# Patient Record
Sex: MALE | Race: WHITE | Employment: STUDENT | URBAN - METROPOLITAN AREA
[De-identification: names, ages, dates, MRNs, and addresses within clinical notes are randomized per-mention and may not be internally consistent; named-entity substitution may affect disease eponyms.]

---

## 2019-05-13 ENCOUNTER — PRE VISIT (OUTPATIENT)
Dept: SLEEP MEDICINE | Facility: CLINIC | Age: 24
End: 2019-05-13

## 2019-05-13 NOTE — TELEPHONE ENCOUNTER
"  1.  Reason for the visit:  Consult to discuss possible sleep apnea.    2.  Referring provider and clinic name:  Self  3.  Previous Sleep Doctor or Pulmonlogist (clinic name)?  Has done Home Sleep Study but never followed up after study to get results  4.  Records, Procedures, Imaging, and Labs (see below)  CAROLYN needed for [facility]        All NOTES from previous office visits that pertain to why they are being seen in the Sleep Center    Previous Sleep Studies, Chest CT, Echos and reports that pertain to why they are seeing Sleep Center    All Sleep records that have been done in the last 2 years that pertain to why they are seeing Sleep Center            Are they being seen for continuation of care for Cpap/Bipap/Avap/Trilogy/Dental Device? None    If yes to above Who and Where was Device issued/currently getting supplies from? na    Are you currently on \"Supplemental Oxygen\" during the day or night?   na                                                                                                                                                      Please remind pt to bring Cpap machine and ask to arrive 15 minutes early to appointment due traffic and congestion                                                 5. Pt Sleep Center Packet received Pt has completed and will bring to appt    Yes: \"please make sure that you bring this to your appointment completed, either the doctor will not see you until this completed or you may be asked to reschedule your appointment.\"     No: mail or email to the pt and explain, \"please make sure that you bring this to your appointment completed, either the doctor will not see you until this completed or you may be asked to reschedule your appointment.\"     ~If pt coming early to fill packet out, ask that they come 30 minutes prior to their appointment~     6. Has the pt's medication list been updated and preferred pharmacy added?     7. Has the allergy list been " "reviewed?    \"Thank you for choosing [facility] and we look forward to seeing you at your upcoming appointment\"     "

## 2019-05-15 ENCOUNTER — OFFICE VISIT (OUTPATIENT)
Dept: SLEEP MEDICINE | Facility: CLINIC | Age: 24
End: 2019-05-15
Payer: COMMERCIAL

## 2019-05-15 VITALS
SYSTOLIC BLOOD PRESSURE: 112 MMHG | DIASTOLIC BLOOD PRESSURE: 70 MMHG | OXYGEN SATURATION: 95 % | RESPIRATION RATE: 16 BRPM | BODY MASS INDEX: 27.85 KG/M2 | WEIGHT: 188 LBS | HEIGHT: 69 IN | HEART RATE: 63 BPM

## 2019-05-15 DIAGNOSIS — G47.10 HYPERSOMNIA: ICD-10-CM

## 2019-05-15 DIAGNOSIS — G47.10 CONTROLLED EXCESSIVE SLEEPINESS WHILE DRIVING: ICD-10-CM

## 2019-05-15 DIAGNOSIS — G47.10 HYPERSOMNIA: Primary | ICD-10-CM

## 2019-05-15 DIAGNOSIS — G47.20 CIRCADIAN RHYTHM DISORDER: ICD-10-CM

## 2019-05-15 LAB
AMPHETAMINES UR QL SCN: NEGATIVE
BARBITURATES UR QL: NEGATIVE
BENZODIAZ UR QL: NEGATIVE
CANNABINOIDS UR QL SCN: NEGATIVE
COCAINE UR QL: NEGATIVE
ETHANOL UR QL SCN: NEGATIVE
OPIATES UR QL SCN: NEGATIVE
PCP UR QL SCN: NEGATIVE

## 2019-05-15 PROCEDURE — 80320 DRUG SCREEN QUANTALCOHOLS: CPT | Performed by: INTERNAL MEDICINE

## 2019-05-15 PROCEDURE — 99215 OFFICE O/P EST HI 40 MIN: CPT | Performed by: INTERNAL MEDICINE

## 2019-05-15 PROCEDURE — 80307 DRUG TEST PRSMV CHEM ANLYZR: CPT | Performed by: INTERNAL MEDICINE

## 2019-05-15 RX ORDER — EPINEPHRINE 0.3 MG/.3ML
0.3 INJECTION SUBCUTANEOUS
COMMUNITY
Start: 2018-07-25

## 2019-05-15 RX ORDER — BETAMETHASONE DIPROPIONATE 0.5 MG/ML
LOTION, AUGMENTED TOPICAL
Refills: 11 | COMMUNITY
Start: 2019-02-14

## 2019-05-15 RX ORDER — HYDROXYZINE HYDROCHLORIDE 50 MG/1
TABLET, FILM COATED ORAL
Refills: 3 | COMMUNITY
Start: 2019-05-06

## 2019-05-15 ASSESSMENT — MIFFLIN-ST. JEOR: SCORE: 1828.14

## 2019-05-15 NOTE — PROGRESS NOTES
Chief complaint: Patient is self-referred for the evaluation of excessive daytime sleepiness    History of Present Illness: 23-year-old gentleman who describes long history of poor quality sleep.  He thinks its been going on for 7 to 8 years.  He has difficulty falling asleep, taking up to 90 minutes to fall asleep.  He also wakes up couple times at night and is not sure why.  Usually he can fall back to sleep within 5 minutes.  He he does have some unusual episodes that occur a couple times a week where he will partly wake up and feel that somebody is in the room and start talking to them.  But nobody will actually be present.  He does remember these episodes.  There is been no sleep walking or sleep eating.  No actual punching kicking or other evidence of dream enactment behavior.  He gets out of bed around 7 AM during the week and 11AM on weekends.  Typically getting into bed around 11 PM on week nights and 1 AM on the weekends.  He is in medical school, first year.  He gets up at 5:30 on Tuesday mornings in order to get to a morning conference.  Recently he experienced excessive sleepiness behind the wheel.  He is counseled on the absolute importance of pulling over if he should become sleepy behind the wheel.  He is taking naps a couple times a week up to 1 to 2 hours in length.  He will do this in between classes if he has time.  Often it will be around 1 or 2 PM.  He feels that his sleepiness is affecting his studies.  He will get excessively sleepy and need to lie down very briefly in the library.   He has not had issues with falling asleep during testing.  No cataplexy or sleep paralysis.  He drinks a couple of coffees in the morning.    He has been told about snoring and in fact he was seen for sleepiness a few years back.  He had a home sleep apnea test at that time that ruled out significant sleep apnea, AHI was 1.  He has lost weight since that time.  He usually snores on his back and only a little on  his side.  Snoring is not heard outside the bedroom.  He has woken himself up with gasping and short of breath sensation on rare occasion.  He has a history of asthma but has not needed to use an inhaler for years.    [place] Seepiness Scale:13 (Less than 10 normal)    EL Total Score: 15      Past Medical History:   Diagnosis Date     Asthma      Chronic eczema        Allergies   Allergen Reactions     Fish Allergy      salmon     Cats Other (See Comments)       Current Outpatient Medications   Medication     augmented betamethasone dipropionate (DIPROLENE) 0.05 % external lotion     Dupilumab (DUPIXENT) 300 MG/2ML syringe     EPINEPHrine (EPIPEN/ADRENACLICK/OR ANY BX GENERIC EQUIV) 0.3 MG/0.3ML injection 2-pack     hydrOXYzine (ATARAX) 50 MG tablet     No current facility-administered medications for this visit.        Social History     Socioeconomic History     Marital status: Single     Spouse name: Not on file     Number of children: Not on file     Years of education: Not on file     Highest education level: Not on file   Occupational History     Not on file   Social Needs     Financial resource strain: Not on file     Food insecurity:     Worry: Not on file     Inability: Not on file     Transportation needs:     Medical: Not on file     Non-medical: Not on file   Tobacco Use     Smoking status: Never Smoker     Smokeless tobacco: Never Used   Substance and Sexual Activity     Alcohol use: Not on file     Drug use: Not on file     Sexual activity: Not on file   Lifestyle     Physical activity:     Days per week: Not on file     Minutes per session: Not on file     Stress: Not on file   Relationships     Social connections:     Talks on phone: Not on file     Gets together: Not on file     Attends Holiness service: Not on file     Active member of club or organization: Not on file     Attends meetings of clubs or organizations: Not on file     Relationship status: Not on file     Intimate partner violence:  "    Fear of current or ex partner: Not on file     Emotionally abused: Not on file     Physically abused: Not on file     Forced sexual activity: Not on file   Other Topics Concern     Not on file   Social History Narrative     Not on file       History reviewed. No pertinent family history.    Review of Systems: Positve for itching for which hydroxyzine helps with, some nasal congestion worse in the spring and fall associated with allergies for which he takes cetirizine, and per HPI, otherwise comprehensive review of systems is negative.    EXAM: Pleasant, alert, no distress  /70   Pulse 63   Resp 16   Ht 1.753 m (5' 9\")   Wt 85.3 kg (188 lb)   SpO2 95%   BMI 27.76 kg/m    HEENT: Normocephalic/atraumatic, pupils are equal, reactive to light, no scleral icterus  Oropharynx: Mallampati 3, narrow posterior pharynx  Neck supple no lymphadenopathy, neck circumference 16 inches  Chest: Clear to auscultation bilaterally, no rales or wheezes  Cardiac: Regular rate and rhythm, S1-S2 normal  Abdomen: Obese, soft and nontender, bowel sounds present  Extremities: Warm, well perfused, no cyanosis, clubbing, or edema  Psychiatric: Mood and affect appear normal  Neurologic: No gross focal deficits    [facility] HST date:11/29/2017  Wt:216 lbs  AHI: 1, lowest sat 90%      ASSESSMENT: 23-year-old gentleman with history of daytime sleepiness, sleep apnea evaluation in 2017 was normal and he is had significant weight loss since that time.  I do not think sleep apnea is the issue here.  He may have some features of circadian rhythm delay with difficulty initiating sleep and difficulty getting up in the morning.  However seems to be managing this reasonably well.  I am concerned about primary hypersomnolence disorder however type I narcolepsy is less likely given no symptoms of cataplexy.  He does have some features that could be consistent with hypnopompic hallucinations.    PLAN: Patient is encouraged to try to " regularize his sleep schedule and sleep in his much as he can with his schedule.  Relaxing bedtime routine and avoiding bright lights before bed.  Get bright light exposure upon awakening.  Proceed with hypersomnia work-up with 2 weeks of actigraphy and sleep logs followed by PSG and mean sleep latency testing.  Urine tox screen will be performed per our protocols.  We will follow-up with me 2 weeks after the sleep study.  He is agreeable with this plan.    Mari Guzman M.D.  Pulmonary/Critical Care/Sleep Medicine    [facility]   Floor 1, [address]   [address] [place] [address]. [place] [address]   Appointments: [phone]    The above note was dictated using voice recognition software and may include typographical errors. Please contact the author for any clarifications.

## 2019-05-15 NOTE — PATIENT INSTRUCTIONS
No long naps--keep them to 20 minutes, sleep as late as you can in general, then get bright light     Your BMI is Body mass index is 27.76 kg/m .  Weight management is a personal decision.  If you are interested in exploring weight loss strategies, the following discussion covers the approaches that may be successful. Body mass index (BMI) is one way to tell whether you are at a healthy weight, overweight, or obese. It measures your weight in relation to your height.  A BMI of 18.5 to 24.9 is in the healthy range. A person with a BMI of 25 to 29.9 is considered overweight, and someone with a BMI of 30 or greater is considered obese. More than two-thirds of American adults are considered overweight or obese.  Being overweight or obese increases the risk for further weight gain. Excess weight may lead to heart disease and diabetes.  Creating and following plans for healthy eating and physical activity may help you improve your health.  Weight control is part of healthy lifestyle and includes exercise, emotional health, and healthy eating habits. Careful eating habits lifelong are the mainstay of weight control. Though there are significant health benefits from weight loss, long-term weight loss with diet alone may be very difficult to achieve- studies show long-term success with dietary management in less than 10% of people. Attaining a healthy weight may be especially difficult to achieve in those with severe obesity. In some cases, medications, devices and surgical management might be considered.  What can you do?  If you are overweight or obese and are interested in methods for weight loss, you should discuss this with your provider.     Consider reducing daily calorie intake by 500 calories.     Keep a food journal.     Avoiding skipping meals, consider cutting portions instead.    Diet combined with exercise helps maintain muscle while optimizing fat loss. Strength training is particularly important for building  and maintaining muscle mass. Exercise helps reduce stress, increase energy, and improves fitness. Increasing exercise without diet control, however, may not burn enough calories to loose weight.       Start walking three days a week 10-20 minutes at a time    Work towards walking thirty minutes five days a week     Eventually, increase the speed of your walking for 1-2 minutes at time    In addition, we recommend that you review healthy lifestyles and methods for weight loss available through the National Institutes of Health patient information sites:  http://win.niddk.nih.gov/publications/index.htm    And look into health and wellness programs that may be available through your health insurance provider, employer, local community center, or bharati club.    Weight management plan: Patient was referred to their PCP to discuss a diet and exercise plan.

## 2019-07-19 ENCOUNTER — HOSPITAL ENCOUNTER (EMERGENCY)
Facility: CLINIC | Age: 24
Discharge: HOME | End: 2019-07-19
Attending: EMERGENCY MEDICINE | Admitting: EMERGENCY MEDICINE
Payer: COMMERCIAL

## 2019-07-19 VITALS
BODY MASS INDEX: 26.66 KG/M2 | DIASTOLIC BLOOD PRESSURE: 81 MMHG | RESPIRATION RATE: 18 BRPM | HEART RATE: 95 BPM | TEMPERATURE: 98.5 F | OXYGEN SATURATION: 98 % | HEIGHT: 69 IN | SYSTOLIC BLOOD PRESSURE: 128 MMHG | WEIGHT: 180 LBS

## 2019-07-19 DIAGNOSIS — K62.5 BRBPR (BRIGHT RED BLOOD PER RECTUM): ICD-10-CM

## 2019-07-19 PROCEDURE — 99282 EMERGENCY DEPT VISIT SF MDM: CPT | Mod: Z6 | Performed by: EMERGENCY MEDICINE

## 2019-07-19 PROCEDURE — 99282 EMERGENCY DEPT VISIT SF MDM: CPT

## 2019-07-19 ASSESSMENT — MIFFLIN-ST. JEOR: SCORE: 1791.85

## 2019-07-19 NOTE — ED PROVIDER NOTES
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  Patient states that he had no pain with bowel movement and states that he found blood on the toilet paper and felt no bulging.  Patient denies any history of Crohn's disease or colitis in any previous history of rectal bleeding.  Patient denies any known trauma.    Patient denies constipation    I have reviewed the Medications, Allergies, Past Medical and Surgical History, and Social History in the [employer] system.    Past Medical History:   Diagnosis Date     Asthma      Chronic eczema      Current Outpatient Medications   Medication Sig Dispense Refill     augmented betamethasone dipropionate (DIPROLENE) 0.05 % external lotion APPLY TO AFFECTED AREAS OF SCALP TWICE A DAY UNTIL IMPROVED.  11     Dupilumab (DUPIXENT) 300 MG/2ML syringe Inject 300 mg Subcutaneous       EPINEPHrine (EPIPEN/ADRENACLICK/OR ANY BX GENERIC EQUIV) 0.3 MG/0.3ML injection 2-pack Inject 0.3 mg into the muscle       hydrOXYzine (ATARAX) 50 MG tablet TAKE 1-2 TABLETS BY MOUTH UP TO TWICE A DAY AS NEEDED  3     Allergies   Allergen Reactions     Fish Allergy      salmon     Cats Other (See Comments)     Social History     Socioeconomic History     Marital status: Single     Spouse name: Not on file     Number of children: Not on file     Years of education: Not on file     Highest education level: Not on file   Occupational History     Not on file   Social Needs     Financial resource strain: Not on file     Food insecurity:     Worry: Not on file     Inability: Not on file     Transportation needs:     Medical: Not on file     Non-medical: Not on file   Tobacco Use     Smoking status: Never Smoker     Smokeless tobacco: Never Used   Substance and Sexual Activity     Alcohol use: Not on file     Drug use: Not on  "file     Sexual activity: Not on file   Lifestyle     Physical activity:     Days per week: Not on file     Minutes per session: Not on file     Stress: Not on file   Relationships     Social connections:     Talks on phone: Not on file     Gets together: Not on file     Attends Gnosticism service: Not on file     Active member of club or organization: Not on file     Attends meetings of clubs or organizations: Not on file     Relationship status: Not on file     Intimate partner violence:     Fear of current or ex partner: Not on file     Emotionally abused: Not on file     Physically abused: Not on file     Forced sexual activity: Not on file   Other Topics Concern     Not on file   Social History Narrative     Not on file     History reviewed. No pertinent surgical history.  No family history on file.      Review of Systems   All other systems reviewed and are negative.      Physical Exam   BP: 128/81  Pulse: 95  Heart Rate: 95  Temp: 98.5  F (36.9  C)  Resp: 18  Height: 175.3 cm (5' 9\")  Weight: 81.6 kg (180 lb)  SpO2: 98 %      Physical Exam   Constitutional: He is oriented to person, place, and time. He appears well-nourished. No distress.   HENT:   Head: Atraumatic.   Eyes: Pupils are equal, round, and reactive to light. EOM are normal.   Neck: Neck supple.   Pulmonary/Chest: No respiratory distress.   Abdominal: Soft. There is no tenderness.   Genitourinary: Rectal exam shows guaiac negative stool.   Genitourinary Comments: Rectal exam revealed no external hemorrhoids present and no palpation no fissures.  Rectal exam found no blood in the rectal vault and was guaiac negative   Musculoskeletal: He exhibits no deformity.   Neurological: He is alert and oriented to person, place, and time.   Skin: Skin is warm.   Psychiatric: He has a normal mood and affect.   Nursing note and vitals reviewed.      ED Course        Procedures            Assessments & Plan (with Medical Decision Making)     I have reviewed the " nursing notes.    With only one episode I do not feel at this point there is any need for laboratory work to be done.  The patient is guaiac negative currently with no evidence of bleeding.  Patient will be sent home to follow-up with the clinic as needed    I have reviewed the findings, diagnosis, plan and need for follow up with the patient.       Medication List      There are no discharge medications for this visit.         Final diagnoses:   BRBPR (bright red blood per rectum)     Routine discharge instructions were given for this diagnosis    Please make an appointment to follow up with [facility] (phone: [phone]) next week if not improving.    7/19/2019   [facility], [place], EMERGENCY DEPARTMENT     Tk Segura MD  07/19/19 6007

## 2019-07-19 NOTE — ED TRIAGE NOTES
23M - presenting to ED for eval / treatment for first-time occurrence of melena (BRB with stool 1/2-hr ago).  No pain, dizziness, diaphoresis noted.

## 2019-07-19 NOTE — DISCHARGE INSTRUCTIONS
Please make an appointment to follow up with [facility] (phone: [phone]) next week if not improving.

## 2019-07-19 NOTE — ED AVS SNAPSHOT
[facility], [place], Emergency Department  [address] [place] [id number]  Phone:  [phone]                                    Rosendo Clayton   MRN: [id number]    Department:  [facility], Emergency Department   Date of Visit:  7/19/2019           After Visit Summary Signature Page    I have received my discharge instructions, and my questions have been answered. I have discussed any challenges I see with this plan with the nurse or doctor.    ..........................................................................................................................................  Patient/Patient Representative Signature      ..........................................................................................................................................  Patient Representative Print Name and Relationship to Patient    ..................................................               ................................................  Date                                   Time    ..........................................................................................................................................  Reviewed by Signature/Title    ...................................................              ..............................................  Date                                               Time          22EPIC Rev 08/18

## 2019-12-02 ENCOUNTER — OFFICE VISIT (OUTPATIENT)
Dept: SLEEP MEDICINE | Facility: CLINIC | Age: 24
End: 2019-12-02
Payer: COMMERCIAL

## 2019-12-02 DIAGNOSIS — R06.83 SNORING: Primary | ICD-10-CM

## 2019-12-02 NOTE — PROGRESS NOTES
Patient presented to clinic for  and demonstration of the Acti-Watch. Patient was set up and instructed use. Patient verbalized understanding and demonstrated set up back to me. Patient will be returning device by 12/16/2019.    Patient was given sleep logs and written instructions for use.

## 2019-12-03 ENCOUNTER — OFFICE VISIT (OUTPATIENT)
Dept: DERMATOLOGY | Facility: CLINIC | Age: 24
End: 2019-12-03
Payer: COMMERCIAL

## 2019-12-03 ENCOUNTER — TELEPHONE (OUTPATIENT)
Dept: DERMATOLOGY | Facility: CLINIC | Age: 24
End: 2019-12-03

## 2019-12-03 DIAGNOSIS — L20.89 OTHER ATOPIC DERMATITIS: Primary | ICD-10-CM

## 2019-12-03 RX ORDER — KETOCONAZOLE 20 MG/ML
SHAMPOO TOPICAL
Refills: 11 | COMMUNITY
Start: 2019-11-06

## 2019-12-03 RX ORDER — TRIAMCINOLONE ACETONIDE 40 MG/ML
40 INJECTION, SUSPENSION INTRA-ARTICULAR; INTRAMUSCULAR ONCE
Status: COMPLETED | OUTPATIENT
Start: 2019-12-03 | End: 2019-12-03

## 2019-12-03 RX ORDER — BETAMETHASONE DIPROPIONATE 0.5 MG/G
OINTMENT, AUGMENTED TOPICAL
Qty: 100 G | Refills: 3 | Status: SHIPPED | OUTPATIENT
Start: 2019-12-03

## 2019-12-03 RX ORDER — BETAMETHASONE DIPROPIONATE 0.5 MG/G
OINTMENT, AUGMENTED TOPICAL
Refills: 2 | COMMUNITY
Start: 2019-06-26 | End: 2019-12-03

## 2019-12-03 RX ADMIN — TRIAMCINOLONE ACETONIDE 40 MG: 40 INJECTION, SUSPENSION INTRA-ARTICULAR; INTRAMUSCULAR at 17:35

## 2019-12-03 ASSESSMENT — PAIN SCALES - GENERAL: PAINLEVEL: NO PAIN (0)

## 2019-12-03 NOTE — NURSING NOTE
Dermatology Rooming Note    Rosendo Matilde's goals for this visit include:   Chief Complaint   Patient presents with     Skin Check     Rosendo is here today for a skin check. He is concerned about the back of his leg and back.     Rome De Luna [facility]

## 2019-12-03 NOTE — NURSING NOTE
Drug Administration Record    Prior to injection, verified patient identity using patient's name and date of birth.  Due to injection administration, patient instructed to remain in clinic for 15 minutes  afterwards, and to report any adverse reaction to me immediately.    Drug Name: triamcinolone acetonide(kenalog)  Dose: 1mL of triamcinolone 40mg/mL, 40mg dose  Route administered: IM  NDC #: [id number]: Kenalog-40 ([address]-1)  Amount of waste(mL):0  Reason for waste: Single use vial    LOT #: [id number]  SITE: Right deltoid  : [employer]  EXPIRATION DATE: 04/2021

## 2019-12-03 NOTE — PATIENT INSTRUCTIONS
I'm sorry your eczema is such a problem.  We will try to get dupilumab covered.    Please do eczema boot camp.  Every night for 2 weeks, take bleach bath followed by wet wrap.  To make a bleach bath, put 1/2 cup plain bleach for a full tub 2- 3 times weekly.  Then pat dry, put on moistuizer and steroid, wrap in damp pajamas, then dry pajamas.  Stay in pajamas for about 1 hour.    Return in 1 month, sooner if concerns.    Pediatric Dermatology   [place] [id number], Clinic [address] [place] [id number]  [phone]  Wet Wrap Therapy   How do I do wet wraps?  Wet wraps can hydrate and calm the skin. They also help to decrease the itch and help your child sleep. You will use wet wraps AFTER bathing and applying the medications and moisturizers. All you need for wet wraps are two pairs of cotton pajamas (or onesies) and a sink with warm water.   Follow these 4 steps:  1. Take one pair of pajamas or a onesie and soak it in warm water     2. Wring out the onesie or pajamas until they are only slightly damp.       3. Put the damp onesie or pajamas on your child. Then put the dry onesie or pajamas on top of the wet onesie/pajamas.       4. Make sure the child s room is warm enough before your child goes to sleep.           When can I stop treatment?  Once your child no longer has an itchy, red, or scaly rash, you can start to decrease your use of the topical steroids and antihistamines. However, since atopic dermatitis is a long-lasting disorder, it is important to CONTINUE regular bathing and moisturizing as well as occasional dilute bleach baths. This will help prevent your child s atopic dermatitis from getting worse and hopefully prevent outbreaks.

## 2019-12-03 NOTE — LETTER
12/3/2019       RE: Rosendo Clayton  [address] [facility] [address]     Dear Colleague,    Thank you for referring your patient, Rosendo Clayton, to the [facility] DERMATOLOGY at [facility]. Please see a copy of my visit note below.    [facility] Dermatology Note      Dermatology Problem List:  1. Atopic dermatitis.  - Long-standing, required hospitalization in youth  - On dupilumab since January 2019 with improvement  - Has received intermittent IM kenalog, repeated 12/3/19 with plans to avoid in future  - Topicals: augmented betamethasone ointment, betamethasone solution, ketoconazole shampoo  - Had prior bx ~2018, getting outside records    Encounter Date: Dec 3, 2019    CC:  Chief Complaint   Patient presents with     Skin Check     Rosendo is here today for a skin check. He is concerned about the back of his leg and back.         History of Present Illness:  Mr. Rosendo Clayton is a 23 year old male who presents today for evaluation of eczema. He is a new patient to our clinic. 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 Today he presents for evaluation of atopic dermatitis. His main concern is  extremely itchy patches on his trunk and extremities. The patient says he scratches in his sleep and the itching is so severe it sometimes wakes him up in the middle of the night. He has previously been diagnosed with eczema, and had severe eczema as a child, at one point requiring hospitalization. He was started on dupilumab in Janaury 2019 which has been helpful but now he might be able to afford it anymore. He currently uses betamethasone ointment and solution as well as ketoconazole shampoo. He has intermittently required IM kenalog and would like this today as his skin is flaring currently and he has finals coming up. He previously had a biopsy about 1 year ago at outside derm. He uses Cera Ve for moisturizer and cleanser. Patient also has  asthma and allergies. His maternal uncle had melanoma. Patient is otherwise feeling well, no additional skin concerns.      Past Medical History:   Patient Active Problem List   Diagnosis     Hypersomnia     Circadian rhythm disorder     Past Medical History:   Diagnosis Date     Asthma      Chronic eczema      History reviewed. No pertinent surgical history.    Social History:  Patient reports that he has never smoked. He has never used smokeless tobacco.   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    Family History:  Family History   Problem Relation Age of Onset     Melanoma Maternal Uncle      Skin Cancer No family hx of        Medications:  Current Outpatient Medications   Medication Sig Dispense Refill     augmented betamethasone dipropionate (DIPROLENE) 0.05 % external lotion APPLY TO AFFECTED AREAS OF SCALP TWICE A DAY UNTIL IMPROVED.  11     augmented betamethasone dipropionate (DIPROLENE-AF) 0.05 % external ointment Apply twice daily to rash on body 100 g 3     Dupilumab (DUPIXENT) 300 MG/2ML syringe Inject 2 mLs (300 mg) Subcutaneous every 14 days 4 mL 3     EPINEPHrine (EPIPEN/ADRENACLICK/OR ANY BX GENERIC EQUIV) 0.3 MG/0.3ML injection 2-pack Inject 0.3 mg into the muscle       hydrOXYzine (ATARAX) 50 MG tablet TAKE 1-2 TABLETS BY MOUTH UP TO TWICE A DAY AS NEEDED  3     ketoconazole (NIZORAL) 2 % external shampoo APPLY TO SCALP AND THEN WASH OFF 2-3 TIMES WEEKLY FOR SCALE AND REDNESS.  11       Allergies   Allergen Reactions     Fish Allergy      salmon     Cats Other (See Comments)       Review of Systems:  -Skin New Pt: The patient denies any rash, pruritus, or lesions that are symptomatic, changing or bleeding, except as per HPI.  -Constitutional: The patient is feeling generally well.    Physical exam:  GEN: This is a well developed, well-nourished male in no acute distress, in a pleasant mood.    SKIN: Total skin, excluding undergarments, which includes the head/face, both arms, chest, back,  abdomen,both legs, digits and/or nails, was examined.  - Mild patches of erythema and scale throughout scalp   - Back, arms, legs, shoulders, lateral chest, and lower abdomen with many medium pink patches and overlying scale.   - Posterior legs many medium pink patches and overlying scale with fissuring and lichenification.   - No other lesions of concern on areas examined.     Impression/Plan:  1. Atopic dermatitis, long standing with history of hospitalizations in youth. Recently better on dupilumab (started Jan 2019) but still with some ongoing activity. Advised more aggressive topical regimen as below, with addition of bleach baths and wet wraps. Additionally he can no longer afford dupilumab, which he is paying for out of pocket, and so we will try to get this covered. Lastly he is very concerned about possible flare with upcoming med school finals and would really like IM injection of Kenalog. I discussed with him that I really like to avoid this in patients with eczema as it can lead to rebound, but given his situation would be okay with a one time dose. Discussed I would not repeat in the future.  Kenalog injection procedure note: After verbal consent and discussion of risks including but not limited to atrophy, pain, and bruising, time out was performed, the patient underwent positioning and the area was prepped with isopropyl alcohol, 1 total cc of Kenalog 40 mg/cc was injected into deltoid. The patient tolerated the procedure well and left the Dermatology clinic in good condition.    Start bleach baths and wet wraps nightly for next 2 weeks. Instructions provided.    Refilled augmented betamethasone ointment to be used BID prn.    Continue dupilumab, will try to help with insurance issues.     Could consider patch testing in the future but exam not clearly suggestive of allergen.     If unable to continue dupilumab, will likely need to consider other systemic options for patient.    Follow-up in 1 month,  earlier for new or changing lesions.       Staff Involved:    Scribe Disclosure  I, Crystal Live, am serving as a scribe to document services personally performed by Dr. Kelsey Kelly MD, based on data collection and the provider's statements to me.     Provider Disclosure:   The documentation recorded by the scribe accurately reflects the services I personally performed and the decisions made by me.    Kelsey Kelly MD    Department of Dermatology  [facility]: Phone: [phone], Fax: [phone]

## 2019-12-03 NOTE — PROGRESS NOTES
[facility] Dermatology Note      Dermatology Problem List:  1. Atopic dermatitis.  - Long-standing, required hospitalization in youth  - On dupilumab since January 2019 with improvement  - Has received intermittent IM kenalog, repeated 12/3/19 with plans to avoid in future  - Topicals: augmented betamethasone ointment, betamethasone solution, ketoconazole shampoo  - Had prior bx ~2018, getting outside records    Encounter Date: Dec 3, 2019    CC:  Chief Complaint   Patient presents with     Skin Check     Rosendo is here today for a skin check. He is concerned about the back of his leg and back.         History of Present Illness:  Mr. Rosendo Clayton is a 23 year old male who presents today for evaluation of eczema. He is a new patient to our clinic. 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 His main concern is  extremely itchy patches on his trunk and extremities. The patient says he scratches in his sleep and the itching is so severe it sometimes wakes him up in the middle of the night. He has previously been diagnosed with eczema, and had severe eczema as a child, at one point requiring hospitalization. He was started on dupilumab in Janaury 2019 which has been helpful but now he might be able to afford it anymore. He currently uses betamethasone ointment and solution as well as ketoconazole shampoo. He has intermittently required IM kenalog and would like this today as his skin is flaring currently and he has finals coming up. He previously had a biopsy about 1 year ago at outside derm. He uses Cera Ve for moisturizer and cleanser. Patient also has asthma and allergies. His maternal uncle had melanoma. Patient is otherwise feeling well, no additional skin concerns.      Past Medical History:   Patient Active Problem List   Diagnosis     Hypersomnia     Circadian rhythm disorder     Past Medical History:   Diagnosis Date      Asthma      Chronic eczema      History reviewed. No pertinent surgical history.    Social History:  Patient reports that he has never smoked. He has never used smokeless tobacco.   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    Family History:  Family History   Problem Relation Age of Onset     Melanoma Maternal Uncle      Skin Cancer No family hx of        Medications:  Current Outpatient Medications   Medication Sig Dispense Refill     augmented betamethasone dipropionate (DIPROLENE) 0.05 % external lotion APPLY TO AFFECTED AREAS OF SCALP TWICE A DAY UNTIL IMPROVED.  11     augmented betamethasone dipropionate (DIPROLENE-AF) 0.05 % external ointment Apply twice daily to rash on body 100 g 3     Dupilumab (DUPIXENT) 300 MG/2ML syringe Inject 2 mLs (300 mg) Subcutaneous every 14 days 4 mL 3     EPINEPHrine (EPIPEN/ADRENACLICK/OR ANY BX GENERIC EQUIV) 0.3 MG/0.3ML injection 2-pack Inject 0.3 mg into the muscle       hydrOXYzine (ATARAX) 50 MG tablet TAKE 1-2 TABLETS BY MOUTH UP TO TWICE A DAY AS NEEDED  3     ketoconazole (NIZORAL) 2 % external shampoo APPLY TO SCALP AND THEN WASH OFF 2-3 TIMES WEEKLY FOR SCALE AND REDNESS.  11       Allergies   Allergen Reactions     Fish Allergy      salmon     Cats Other (See Comments)       Review of Systems:  -Skin New Pt: The patient denies any rash, pruritus, or lesions that are symptomatic, changing or bleeding, except as per HPI.  -Constitutional: The patient is feeling generally well.    Physical exam:  GEN: This is a well developed, well-nourished male in no acute distress, in a pleasant mood.    SKIN: Total skin, excluding undergarments, which includes the head/face, both arms, chest, back, abdomen,both legs, digits and/or nails, was examined.  - Mild patches of erythema and scale throughout scalp   - Back, arms, legs, shoulders, lateral chest, and lower abdomen with many medium pink patches and overlying scale.   - Posterior legs many medium pink patches and  overlying scale with fissuring and lichenification.   - No other lesions of concern on areas examined.     Impression/Plan:  1. Atopic dermatitis, long standing with history of hospitalizations in youth. Recently better on dupilumab (started Jan 2019) but still with some ongoing activity. Advised more aggressive topical regimen as below, with addition of bleach baths and wet wraps. Additionally he can no longer afford dupilumab, which he is paying for out of pocket, and so we will try to get this covered. Lastly he is very concerned about possible flare with upcoming med school finals and would really like IM injection of Kenalog. I discussed with him that I really like to avoid this in patients with eczema as it can lead to rebound, but given his situation would be okay with a one time dose. Discussed I would not repeat in the future.  Kenalog injection procedure note: After verbal consent and discussion of risks including but not limited to atrophy, pain, and bruising, time out was performed, the patient underwent positioning and the area was prepped with isopropyl alcohol, 1 total cc of Kenalog 40 mg/cc was injected into deltoid. The patient tolerated the procedure well and left the Dermatology clinic in good condition.    Start bleach baths and wet wraps nightly for next 2 weeks. Instructions provided.    Refilled augmented betamethasone ointment to be used BID prn.    Continue dupilumab, will try to help with insurance issues.     Could consider patch testing in the future but exam not clearly suggestive of allergen.     If unable to continue dupilumab, will likely need to consider other systemic options for patient.    Follow-up in 1 month, earlier for new or changing lesions.       Staff Involved:    Scribe Disclosure  I, Crystal Live, am serving as a scribe to document services personally performed by Dr. Kelsey Kelly MD, based on data collection and the provider's statements to me.     Provider  Disclosure:   The documentation recorded by the scribe accurately reflects the services I personally performed and the decisions made by me.    Kelsey Kelly MD    Department of Dermatology  [facility]: Phone: [phone], Fax: [phone]

## 2019-12-04 NOTE — TELEPHONE ENCOUNTER
Current Dupixent pt - can no longer afford (it appears that copay card has maxed out?). LVM for [facility] pharmacy where pt is restricted to filling. Also sent email to [employer] rep to ask about options for this situation.    PA in place thru 7/2020.

## 2019-12-09 ENCOUNTER — MYC MEDICAL ADVICE (OUTPATIENT)
Dept: PHARMACY | Facility: CLINIC | Age: 24
End: 2019-12-09

## 2019-12-09 NOTE — TELEPHONE ENCOUNTER
Dupixent rep was uncertain of answers. No grants open and we do not house samples. Advised pt to call DMW program for better assistance: [phone].

## 2019-12-17 ENCOUNTER — TELEPHONE (OUTPATIENT)
Dept: SLEEP MEDICINE | Facility: CLINIC | Age: 24
End: 2019-12-17

## 2019-12-17 NOTE — TELEPHONE ENCOUNTER
Patient missed sleep study scheduled 12/16/19. Actiwatch, PSG, MSLT. Patient called today to reschedule. Patient notified that the appointments would need to be rescheduled and he will need to return the actiwatch that he currently has. Patient agreeable and stated that he will return the actiwatch Wednesday 12/18/19 and reschedule the appointments at a later date.

## 2019-12-18 ENCOUNTER — DOCUMENTATION ONLY (OUTPATIENT)
Dept: SLEEP MEDICINE | Facility: CLINIC | Age: 24
End: 2019-12-18
Payer: COMMERCIAL

## 2019-12-20 NOTE — PROGRESS NOTES
Patient returned Acti device. Patient no-showed sleep study scheduled 12/16/19. Actiwatch, PSG, MSLT. Patient was notified by the  that all the appointments would need to be rescheduled and he will need to return the actiwatch that he currently has. Patient agreed and acti was returned 12/18/2019. Pt states he will reschedule all appointments at a later date.     Clifton Meier  Sleep Clinic Specialist - [place]

## 2020-01-22 ENCOUNTER — MYC MEDICAL ADVICE (OUTPATIENT)
Dept: DERMATOLOGY | Facility: CLINIC | Age: 25
End: 2020-01-22

## 2020-01-22 DIAGNOSIS — L20.89 OTHER ATOPIC DERMATITIS: Primary | ICD-10-CM

## 2020-01-22 RX ORDER — FLUOCINONIDE TOPICAL SOLUTION USP, 0.05% 0.5 MG/ML
SOLUTION TOPICAL 2 TIMES DAILY
Qty: 60 ML | Refills: 3 | Status: SHIPPED | OUTPATIENT
Start: 2020-01-22

## 2020-01-22 NOTE — TELEPHONE ENCOUNTER
Thank you Rome! Your timing is impeccable :)  You can ignore this message Adrianna, sorry to fill your inbasket!!!

## 2020-01-22 NOTE — TELEPHONE ENCOUNTER
If patient lets us know which one he wants, can you send to me and TIIM?  I'm in meeting all day and not sure when I will be able to respond to the message.  Thank you!

## 2020-02-04 ENCOUNTER — OFFICE VISIT (OUTPATIENT)
Dept: DERMATOLOGY | Facility: CLINIC | Age: 25
End: 2020-02-04
Payer: COMMERCIAL

## 2020-02-04 DIAGNOSIS — L20.89 OTHER ATOPIC DERMATITIS: ICD-10-CM

## 2020-02-04 RX ORDER — HYDROCORTISONE 25 MG/G
OINTMENT TOPICAL 2 TIMES DAILY
Qty: 60 G | Refills: 3 | Status: SHIPPED | OUTPATIENT
Start: 2020-02-04

## 2020-02-04 ASSESSMENT — PAIN SCALES - GENERAL: PAINLEVEL: MILD PAIN (2)

## 2020-02-04 NOTE — NURSING NOTE
Drug Administration Record    Prior to injection, verified patient identity using patient's name and date of birth.  Due to injection administration, patient instructed to remain in clinic for 15 minutes  afterwards, and to report any adverse reaction to me immediately.    Drug Name: triamcinolone acetonide(kenalog)  Dose: 0.3mL of triamcinolone 10mg/mL, 3mg dose  Route administered: ID  NDC #: [id number]: Kenalog-10 (4211-8843-49)  Amount of waste(mL):4.7  Reason for waste: Multi dose vial    LOT #: [id number]  SITE: [facility]  : [employer]  EXPIRATION DATE: 06/2021

## 2020-02-04 NOTE — NURSING NOTE
Dermatology Rooming Note    Rosendo Clayton's goals for this visit include:   Chief Complaint   Patient presents with     Derm Problem     Other atopic dermatitis - Francois states he has improved everywhere but his scalp     Rome De Luna CMA

## 2020-02-04 NOTE — LETTER
2/4/2020       RE: Rosendo Clayton  [address] [facility] [address]     Dear Colleague,    Thank you for referring your patient, Rosendo Clayton, to the [facility] DERMATOLOGY at [facility]. Please see a copy of my visit note below.    [facility] Dermatology Note      Dermatology Problem List:  1. Atopic dermatitis.  - Long-standing, required hospitalization in youth  - Was on dupilumab from about January- December 2019 with improvement, now off with insurance issues but planning to get back on  - Has received intermittent IM kenalog, repeated 12/3/19 with plans to avoid in future  - ILK to persistent scalp plaques 2/4/2020  - Topicals: augmented betamethasone ointment, betamethasone solution, ketoconazole shampoo, lidex solution, hydrocortisone 2.5% ointment  - Had prior bx ~2018, getting outside records    Encounter Date: Feb 4, 2020    CC:  Chief Complaint   Patient presents with     Derm Problem     Other atopic dermatitis - Francois states he has improved everywhere but his scalp         History of Present Illness:  Mr. Rosendo Clayton is a 24 year old male who presents today as a follow up for atopic dermatitis. He was last seen on 12/3/19 where he was given ILK 1 mL of 40 mg/mL injection into deltoid. He also continued bleach baths, augmented betamethasone ointment BID prn, and dupilumab.    On 1/22/2020 he communicated via [employer] that his scalp became unbearably itchy even with hydroxyzine, benadryl and ketoconazole. He was given Lidex solution which resolved the itching.     Today he is overall doing well. Notes his scalp is improved since addition of the lidex solution, but is still not resolved. He is also experiencing itching behind his knees today and has been bothering him. No issues with the IM kenalog and he feels it helped a lot. Since his last visit, he continued the management as directed, but was not able to continue on dupilumab  due to issues with his insurance. However, he recently confirmed that he will be able to get the dupilumab through a different insurance and would like this sent to the pharmacy today. Patient is otherwise feeling well, no additional skin concerns.    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    Past Medical History:   Patient Active Problem List   Diagnosis     Hypersomnia     Circadian rhythm disorder     Past Medical History:   Diagnosis Date     Asthma      Chronic eczema      No past surgical history on file.    Social History:  Patient reports that he has never smoked. He has never used smokeless tobacco.  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     Family History:  Family History   Problem Relation Age of Onset     Melanoma Maternal Uncle      Skin Cancer No family hx of        Medications:  Current Outpatient Medications   Medication Sig Dispense Refill     augmented betamethasone dipropionate (DIPROLENE) 0.05 % external lotion APPLY TO AFFECTED AREAS OF SCALP TWICE A DAY UNTIL IMPROVED.  11     augmented betamethasone dipropionate (DIPROLENE-AF) 0.05 % external ointment Apply twice daily to rash on body 100 g 3     Dupilumab (DUPIXENT) 300 MG/2ML syringe Inject 2 mLs (300 mg) Subcutaneous every 14 days 4 mL 3     EPINEPHrine (EPIPEN/ADRENACLICK/OR ANY BX GENERIC EQUIV) 0.3 MG/0.3ML injection 2-pack Inject 0.3 mg into the muscle       fluocinonide (LIDEX) 0.05 % external solution Apply topically 2 times daily To scalp as needed 60 mL 3     hydrocortisone 2.5 % ointment Apply topically 2 times daily To face as needed 60 g 3     hydrOXYzine (ATARAX) 50 MG tablet TAKE 1-2 TABLETS BY MOUTH UP TO TWICE A DAY AS NEEDED  3     ketoconazole (NIZORAL) 2 % external shampoo APPLY TO SCALP AND THEN WASH OFF 2-3 TIMES WEEKLY FOR SCALE AND REDNESS.  11       Allergies   Allergen Reactions     Fish Allergy      salmon     Cats Other (See Comments)       Review of Systems:  -Skin Establ Pt: The patient denies any new rash,  pruritus, or lesions that are symptomatic, changing or bleeding, except as per HPI.  -Constitutional: The patient is feeling generally well.    Physical exam:  GEN: This is a well developed, well-nourished male in no acute distress, in a pleasant mood.    SKIN: Focused examination of the scalp, face, neck, chest back, arms, and legs was performed.  - Morris type: II  - Back with many ill-defined light pink scaly patches.  - R posterior thighs with many scattered hyperpigmented macules with mild scale.   - Popliteal fossa mild erythema and scale.   - Scalp with several scattered light pink scaly macules. Temples with few light pink scaly macules.  - No other lesions of concern on areas examined.     Impression/Plan:  1. Atopic dermatitis, long standing with history of hospitalizations in youth  Since his last visit where he received IM kenalog, he has had improvement but overall the long-term plan is for him to get back on dupilumab. His insurance has changed so he anticipates this will soon be possible. We resent this to the pharmacy. He also has a few stubborn spots on his scalp that are recalcitrant to topical steroid. Offered ILK today, patient is agreeable to this. Otherwise continue topical management as below. Will add mild potency steroid for face.  - Resume dupilumab 300 mg every 2 weeks, prescription resent to pharmacy.  - Kenalog injection procedure note: After verbal consent and discussion of risks including but not limited to atrophy, pain, and bruising, time out was performed, the patient underwent positioning and the area was prepped with isopropyl alcohol,  0.3 total cc of Kenalog 10 mg/cc was injected into 5 sites on the scalp. The patient tolerated the procedure well and left the Dermatology clinic in good condition.  - Continue augmented betamethasone ointment to be used BID prn to trunk and extremities.   - Start hydrocortisone 2.5% ointment BID prn to face.   - Continue Lidex solution BID  prn to scalp.   - Could consider patch testing in the future but exam not clearly suggestive of allergen.    Follow-up in 6 months, earlier for new or changing lesions.       Staff Involved:    Scribe Disclosure  I, Crystal Live, am serving as a scribe to document services personally performed by Dr. Kelsey Kelly MD, based on data collection and the provider's statements to me.     Provider Disclosure:   The documentation recorded by the scribe accurately reflects the services I personally performed and the decisions made by me.    Kelsey Kelly MD    Department of Dermatology  [facility]: Phone: [phone], Fax: [phone]

## 2020-02-04 NOTE — PATIENT INSTRUCTIONS
I'm glad things are doing better.  I hope we can get you the dupilumab soon.    For your scalp, continue the Lidex.  For the body, continue the betamethasone.    For the face, start they hydrocortisone 2.5% ointment.    Return in about 6 months, can move up/down depending on how you are doing.

## 2020-02-04 NOTE — PROGRESS NOTES
[facility] D[facility]atology Note      Dermatology Problem List:  1. Atopic dermatitis.  - Long-standing, required hospitalization in youth  - Was on dupilumab from about January- December 2019 with improvement, now off with insurance issues but planning to get back on  - Has received intermittent IM kenalog, repeated 12/3/19 with plans to avoid in future  - ILK to persistent scalp plaques 2/4/2020  - Topicals: augmented betamethasone ointment, betamethasone solution, ketoconazole shampoo, lidex solution, hydrocortisone 2.5% ointment  - Had prior bx ~2018, getting outside records    Encounter Date: Feb 4, 2020    CC:  Chief Complaint   Patient presents with     Derm Problem     Other atopic dermatitis - Francois states he has improved everywhere but his scalp         History of Present Illness:  Mr. Rosendo Clayton is a 24 year old male who presents today as a follow up for atopic dermatitis. He was last seen on 12/3/19 where he was given ILK 1 mL of 40 mg/mL injection into deltoid. He also continued bleach baths, augmented betamethasone ointment BID prn, and dupilumab.    On 1/22/2020 he communicated via [employer] that his scalp became unbearably itchy even with hydroxyzine, benadryl and ketoconazole. He was given Lidex solution which resolved the itching.     Today he is overall doing well. Notes his scalp is improved since addition of the lidex solution, but is still not resolved. He is also experiencing itching behind his knees today and has been bothering him. No issues with the IM kenalog and he feels it helped a lot. Since his last visit, he continued the management as directed, but was not able to continue on dupilumab due to issues with his insurance. However, he recently confirmed that he will be able to get the dupilumab through a different insurance and would like this sent to the pharmacy today. Patient is otherwise feeling well, no additional skin concerns.    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    Past Medical History:   Patient Active Problem List   Diagnosis     Hypersomnia     Circadian rhythm disorder     Past Medical History:   Diagnosis Date     Asthma      Chronic eczema      No past surgical history on file.    Social History:  Patient reports that he has never smoked. He has never used smokeless tobacco.  He is a medical student at [employer].     Family History:  Family History   Problem Relation Age of Onset     Melanoma Maternal Uncle      Skin Cancer No family hx of        Medications:  Current Outpatient Medications   Medication Sig Dispense Refill     augmented betamethasone dipropionate (DIPROLENE) 0.05 % external lotion APPLY TO AFFECTED AREAS OF SCALP TWICE A DAY UNTIL IMPROVED.  11     augmented betamethasone dipropionate (DIPROLENE-AF) 0.05 % external ointment Apply twice daily to rash on body 100 g 3     Dupilumab (DUPIXENT) 300 MG/2ML syringe Inject 2 mLs (300 mg) Subcutaneous every 14 days 4 mL 3     EPINEPHrine (EPIPEN/ADRENACLICK/OR ANY BX GENERIC EQUIV) 0.3 MG/0.3ML injection 2-pack Inject 0.3 mg into the muscle       fluocinonide (LIDEX) 0.05 % external solution Apply topically 2 times daily To scalp as needed 60 mL 3     hydrocortisone 2.5 % ointment Apply topically 2 times daily To face as needed 60 g 3     hydrOXYzine (ATARAX) 50 MG tablet TAKE 1-2 TABLETS BY MOUTH UP TO TWICE A DAY AS NEEDED  3     ketoconazole (NIZORAL) 2 % external shampoo APPLY TO SCALP AND THEN WASH OFF 2-3 TIMES WEEKLY FOR SCALE AND REDNESS.  11       Allergies   Allergen Reactions     Fish Allergy      salmon     Cats Other (See Comments)       Review of Systems:  -Skin Establ Pt: The patient denies any new rash, pruritus, or lesions that are symptomatic, changing or bleeding, except as per HPI.  -Constitutional: The patient is feeling generally well.    Physical exam:  GEN: This is a well developed, well-nourished male in no acute distress, in a pleasant mood.    SKIN: Focused examination of the  scalp, face, neck, chest back, arms, and legs was performed.  - Morris type: II  - Back with many ill-defined light pink scaly patches.  - R posterior thighs with many scattered hyperpigmented macules with mild scale.   - Popliteal fossa mild erythema and scale.   - Scalp with several scattered light pink scaly macules. Temples with few light pink scaly macules.  - No other lesions of concern on areas examined.     Impression/Plan:  1. Atopic dermatitis, long standing with history of hospitalizations in youth  Since his last visit where he received IM kenalog, he has had improvement but overall the long-term plan is for him to get back on dupilumab. His insurance has changed so he anticipates this will soon be possible. We resent this to the pharmacy. He also has a few stubborn spots on his scalp that are recalcitrant to topical steroid. Offered ILK today, patient is agreeable to this. Otherwise continue topical management as below. Will add mild potency steroid for face.  - Resume dupilumab 300 mg every 2 weeks, prescription resent to pharmacy.  - Kenalog injection procedure note: After verbal consent and discussion of risks including but not limited to atrophy, pain, and bruising, time out was performed, the patient underwent positioning and the area was prepped with isopropyl alcohol, 0.3 total cc of Kenalog 10 mg/cc was injected into 5 sites on the scalp. The patient tolerated the procedure well and left the Dermatology clinic in good condition.  - Continue augmented betamethasone ointment to be used BID prn to trunk and extremities.   - Start hydrocortisone 2.5% ointment BID prn to face.   - Continue Lidex solution BID prn to scalp.   - Could consider patch testing in the future but exam not clearly suggestive of allergen.    Follow-up in 6 months, earlier for new or changing lesions.       Staff Involved:    Scribe Disclosure  I, Crystal Live, am serving as a scribe to document services personally  performed by Dr. Kelsey Kelly MD, based on data collection and the provider's statements to me.     Provider Disclosure:   The documentation recorded by the scribe accurately reflects the services I personally performed and the decisions made by me.    Kelsey Kelly MD    Department of Dermatology  [facility]: Phone: [phone], Fax: [phone]

## 2020-02-05 ENCOUNTER — TELEPHONE (OUTPATIENT)
Dept: DERMATOLOGY | Facility: CLINIC | Age: 25
End: 2020-02-05

## 2020-02-05 NOTE — TELEPHONE ENCOUNTER
Tried calling patient to get his pharmacy insurance information and it said his voicemail box was not yet set up, goodbye. He then tried calling back right away and when I answered [facility] he said wrong # and hung up. Need to obtain his new pharmacy coverage. I was unable to find anything on the [employer] portal or the [employer] portal and it seems like he had [employer] last year which changed over from [employer] as PBM to [employer].     Unable to submit PA/verify coverage until we get his insurance info. Will continue to try to reach him.

## 2020-02-28 NOTE — TELEPHONE ENCOUNTER
Called patient, learned that he has [facility] coverage (no specialty meds are covered) - advised that we would email him the Dupixent free drug application.  Patient verbalized understanding.

## 2020-03-11 ENCOUNTER — HEALTH MAINTENANCE LETTER (OUTPATIENT)
Age: 25
End: 2020-03-11

## 2021-04-25 ENCOUNTER — HEALTH MAINTENANCE LETTER (OUTPATIENT)
Age: 26
End: 2021-04-25

## 2021-07-14 ENCOUNTER — MYC MEDICAL ADVICE (OUTPATIENT)
Dept: SLEEP MEDICINE | Facility: CLINIC | Age: 26
End: 2021-07-14

## 2021-07-14 ENCOUNTER — TELEPHONE (OUTPATIENT)
Dept: SLEEP MEDICINE | Facility: CLINIC | Age: 26
End: 2021-07-14

## 2021-07-14 NOTE — TELEPHONE ENCOUNTER
Reason for call:  Other   Patient called regarding (reason for call): appointment  Additional comments: need to schedule overnight sleep study. 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 Will not be available to take calls.      Please message through [employer]

## 2021-07-26 NOTE — TELEPHONE ENCOUNTER
Pt called and informed that our sleep tech will give him a call to schedule MSLT.  Pt would like the [place] location.    JEANETTE Joiner

## 2021-10-10 ENCOUNTER — HEALTH MAINTENANCE LETTER (OUTPATIENT)
Age: 26
End: 2021-10-10

## 2022-03-16 ENCOUNTER — TELEPHONE (OUTPATIENT)
Dept: SLEEP MEDICINE | Facility: CLINIC | Age: 27
End: 2022-03-16
Payer: COMMERCIAL

## 2022-03-16 NOTE — TELEPHONE ENCOUNTER
Reason for call:  Other   Patient called regarding (reason for call): call back  Additional comments: Patient was never able to complete the sleep study back in 2019 and would now like to. Needing an updated order for a sleep study.    Phone number to reach patient:  Cell number on file:    Telephone Information:   Mobile [phone]       Best Time:  Anytime    Can we leave a detailed message on this number?  YES    Travel screening: Not Applicable

## 2022-03-17 ENCOUNTER — TELEPHONE (OUTPATIENT)
Dept: SLEEP MEDICINE | Facility: CLINIC | Age: 27
End: 2022-03-17
Payer: COMMERCIAL

## 2022-03-17 NOTE — TELEPHONE ENCOUNTER
Spoke w/ pt he stated he hasnt been seen since 2019, however his sleep study was postponed due to covid and he is leaving in July. He doesn't want to schedule a consult when he knows he has to do the study. Sending for provider review.    Damion Crow, Visit facilitator

## 2022-03-17 NOTE — TELEPHONE ENCOUNTER
LVMTCB, Pt needs to schedule consult it will e 3 years in May and he hasn't been seen since 2019. If he calls scheduling please set him up for consult (60 min) with Dr. Guzman.    Damion Crow, Visit facilitator

## 2022-03-17 NOTE — TELEPHONE ENCOUNTER
Called pt, orders have been extended and will send to sleep tech.    Damion Crow, Visit facilitator

## 2022-03-29 ENCOUNTER — TELEPHONE (OUTPATIENT)
Dept: SLEEP MEDICINE | Facility: CLINIC | Age: 27
End: 2022-03-29
Payer: COMMERCIAL

## 2022-03-29 NOTE — TELEPHONE ENCOUNTER
Reason for Call:  Other appointment    Detailed comments: patient called to set up a sleep study and there is no current order.      Phone Number Patient can be reached at: Home number on file [phone] (home)    Best Time: anytime    Can we leave a detailed message on this number? YES    Call taken on 3/29/2022 at 1:36 PM by Tamera Rodney

## 2022-05-21 ENCOUNTER — HEALTH MAINTENANCE LETTER (OUTPATIENT)
Age: 27
End: 2022-05-21

## 2022-09-18 ENCOUNTER — HEALTH MAINTENANCE LETTER (OUTPATIENT)
Age: 27
End: 2022-09-18

## 2023-06-04 ENCOUNTER — HEALTH MAINTENANCE LETTER (OUTPATIENT)
Age: 28
End: 2023-06-04

## (undated) RX ORDER — TRIAMCINOLONE ACETONIDE 40 MG/ML
INJECTION, SUSPENSION INTRA-ARTICULAR; INTRAMUSCULAR
Status: DISPENSED
Start: 2019-12-03